# Patient Record
Sex: FEMALE | URBAN - METROPOLITAN AREA
[De-identification: names, ages, dates, MRNs, and addresses within clinical notes are randomized per-mention and may not be internally consistent; named-entity substitution may affect disease eponyms.]

---

## 2021-04-21 ENCOUNTER — APPOINTMENT (RX ONLY)
Dept: URBAN - METROPOLITAN AREA CLINIC 42 | Facility: CLINIC | Age: 51
Setting detail: DERMATOLOGY
End: 2021-04-21

## 2021-04-21 DIAGNOSIS — L40.0 PSORIASIS VULGARIS: ICD-10-CM | Status: INADEQUATELY CONTROLLED

## 2021-04-21 DIAGNOSIS — L20.89 OTHER ATOPIC DERMATITIS: ICD-10-CM

## 2021-04-21 PROBLEM — L20.84 INTRINSIC (ALLERGIC) ECZEMA: Status: ACTIVE | Noted: 2021-04-21

## 2021-04-21 PROCEDURE — ? ADDITIONAL NOTES

## 2021-04-21 PROCEDURE — ? PRESCRIPTION MEDICATION MANAGEMENT

## 2021-04-21 PROCEDURE — 99203 OFFICE O/P NEW LOW 30 MIN: CPT

## 2021-04-21 PROCEDURE — ? PRESCRIPTION

## 2021-04-21 PROCEDURE — ? COUNSELING

## 2021-04-21 RX ORDER — BETAMETHASONE DIPROPIONATE 0.5 MG/ML
LOTION, AUGMENTED TOPICAL BID
Qty: 1 | Refills: 1 | Status: ERX | COMMUNITY
Start: 2021-04-21

## 2021-04-21 RX ADMIN — BETAMETHASONE DIPROPIONATE: 0.5 LOTION, AUGMENTED TOPICAL at 00:00

## 2021-04-21 ASSESSMENT — LOCATION ZONE DERM
LOCATION ZONE: SCALP
LOCATION ZONE: HAND

## 2021-04-21 ASSESSMENT — LOCATION SIMPLE DESCRIPTION DERM
LOCATION SIMPLE: RIGHT HAND
LOCATION SIMPLE: POSTERIOR SCALP
LOCATION SIMPLE: LEFT HAND
LOCATION SIMPLE: ANTERIOR SCALP

## 2021-04-21 ASSESSMENT — LOCATION DETAILED DESCRIPTION DERM
LOCATION DETAILED: MID-OCCIPITAL SCALP
LOCATION DETAILED: MID-FRONTAL SCALP
LOCATION DETAILED: LEFT ULNAR DORSAL HAND
LOCATION DETAILED: RIGHT RADIAL DORSAL HAND

## 2021-04-21 NOTE — PROCEDURE: PRESCRIPTION MEDICATION MANAGEMENT
Detail Level: Zone
Render In Strict Bullet Format?: No
Initiate Treatment: Neutrogena T-Sal shampoo \\nBetamethasone solution BID x 2 wks then 1-2x weekly

## 2021-04-21 NOTE — PROCEDURE: ADDITIONAL NOTES
Additional Notes: Clear today, per photos reviewed by pt rash clinically appears as eczema. \\nStressed using  moisturizer after washing hands, advised using gloves while washing dishes.
Detail Level: Simple
Render Risk Assessment In Note?: no

## 2021-06-08 ENCOUNTER — RX ONLY (OUTPATIENT)
Age: 51
Setting detail: RX ONLY
End: 2021-06-08

## 2021-06-08 RX ORDER — CLOBETASOL PROPIONATE 0.5 MG/ML
SOLUTION TOPICAL QD
Qty: 1 | Refills: 0 | Status: ERX | COMMUNITY
Start: 2021-06-08

## 2021-08-11 ENCOUNTER — APPOINTMENT (RX ONLY)
Dept: URBAN - METROPOLITAN AREA CLINIC 42 | Facility: CLINIC | Age: 51
Setting detail: DERMATOLOGY
End: 2021-08-11

## 2021-10-20 ENCOUNTER — APPOINTMENT (RX ONLY)
Dept: URBAN - METROPOLITAN AREA CLINIC 42 | Facility: CLINIC | Age: 51
Setting detail: DERMATOLOGY
End: 2021-10-20

## 2021-10-20 DIAGNOSIS — L40.0 PSORIASIS VULGARIS: ICD-10-CM | Status: INADEQUATELY CONTROLLED

## 2021-10-20 PROCEDURE — ? COUNSELING

## 2021-10-20 PROCEDURE — 99214 OFFICE O/P EST MOD 30 MIN: CPT

## 2021-10-20 PROCEDURE — ? PRESCRIPTION

## 2021-10-20 PROCEDURE — ? ADDITIONAL NOTES

## 2021-10-20 RX ORDER — CLOBETASOL PROPIONATE 0.5 MG/G
AEROSOL, FOAM TOPICAL QHS
Qty: 50 | Refills: 3 | Status: ERX | COMMUNITY
Start: 2021-10-20

## 2021-10-20 RX ORDER — CALCIPOTRIENE AND BETAMETHASONE DIPROPIONATE 50; .5 UG/G; MG/G
SUSPENSION TOPICAL
Qty: 60 | Refills: 3 | Status: ERX | COMMUNITY
Start: 2021-10-20

## 2021-10-20 RX ADMIN — CLOBETASOL PROPIONATE: 0.5 AEROSOL, FOAM TOPICAL at 00:00

## 2021-10-20 RX ADMIN — CALCIPOTRIENE AND BETAMETHASONE DIPROPIONATE: 50; .5 SUSPENSION TOPICAL at 00:00

## 2021-10-20 ASSESSMENT — LOCATION DETAILED DESCRIPTION DERM
LOCATION DETAILED: MID-OCCIPITAL SCALP
LOCATION DETAILED: MID-FRONTAL SCALP

## 2021-10-20 ASSESSMENT — LOCATION ZONE DERM: LOCATION ZONE: SCALP

## 2021-10-20 ASSESSMENT — LOCATION SIMPLE DESCRIPTION DERM
LOCATION SIMPLE: POSTERIOR SCALP
LOCATION SIMPLE: ANTERIOR SCALP

## 2021-10-20 NOTE — PROCEDURE: ADDITIONAL NOTES
Additional Notes: Localized scalp psoriasis \\nNot itchy; scaly plaques on scalp\\nPt status post clobetasol solution + betamethasone lotion - p/s unchanged with medications \\n\\nPlan:\\nClobetasol foam nightly x 4 weeks then weekends only\\nTaclonex nightly on weekdays for maintenance
Render Risk Assessment In Note?: no
Detail Level: Simple